# Patient Record
Sex: FEMALE | Race: WHITE | Employment: OTHER | URBAN - METROPOLITAN AREA
[De-identification: names, ages, dates, MRNs, and addresses within clinical notes are randomized per-mention and may not be internally consistent; named-entity substitution may affect disease eponyms.]

---

## 2024-01-24 ENCOUNTER — APPOINTMENT (OUTPATIENT)
Dept: INTEGRATIVE MEDICINE | Facility: CLINIC | Age: 74
End: 2024-01-24
Payer: MEDICARE

## 2024-01-24 ENCOUNTER — ALLIED HEALTH (OUTPATIENT)
Dept: INTEGRATIVE MEDICINE | Facility: CLINIC | Age: 74
End: 2024-01-24
Payer: MEDICARE

## 2024-01-24 DIAGNOSIS — R53.83 FATIGUE, UNSPECIFIED TYPE: Primary | ICD-10-CM

## 2024-01-24 DIAGNOSIS — Z71.82 EXERCISE COUNSELING: ICD-10-CM

## 2024-01-24 DIAGNOSIS — R23.2 HOT FLASHES: ICD-10-CM

## 2024-01-24 DIAGNOSIS — K59.00 CONSTIPATION, UNSPECIFIED CONSTIPATION TYPE: ICD-10-CM

## 2024-01-24 PROCEDURE — 99205 OFFICE O/P NEW HI 60 MIN: CPT | Performed by: PHYSICIAN ASSISTANT

## 2024-01-24 NOTE — PROGRESS NOTES
"72 y/o female \"Lina\" with PMH HTN, CVA, HLD, anxiety, hyponatremia presents for initial consultation. Self-referral.     Goal of Visit: weakness, hot flashes, digestion, \"hormone problem\"  - avoid pharmaceuticals    Care Team: PCP twice yearly, will see new PCP at [facility] tomorrow; Homeopathic provider    Somatic Complaints:  - Wellness encounter at former employer- reiki, massage, bioenergy, nutrition. Dr. Campbell was able to wean her off BP meds with Jeramielee leigh, mag, potassium. Also follows Dr. Pfeiffer's health journey.   - 4/2021- TIA, a lot of stress at that time  - 11/2023- intermittent, rare \"sensations\" in her head; pulling of L hand; went to ER, negative workup although no MRI  - 1/2024- burning sensation across upper back, \"flutter in chest\"-- seen in ED and workup unremarkable- told probable GERD  - HTN- well-controlled with Rx  - Carotid US- stenosis (1-39%) of right and left ICA  - Hx hyponatremia induced by drinking too much water; now uses gatorade daily   - HLD- on statin 5/2021, recently switched to pravastatin; 1/5/2024- LDL 54, HDL 66, total chol 133; reviewed trends. Offered reassurance about low LDL. Can discuss reducing dose with PCP although do not recommend complete cessation due to rebound effect.   - Weakness x few months- noticeable with going up stairs; improves with consistent exercise  - Fatigue- in muscles  - Hot flash and thirst early in AM or during the day x 1 month-- pcp told her likely due to snoring  - Digestion- hx constipation- treats with Miralax, prune juice  - Belching in AM prior to eating; saw GI who Rx PPI x few week; wants to see another GI specialist  - Toes- become numb with lying down at night to sleep; no color changes or weakness; no symptoms during the day  - FH- dad and pat GM (CVA)    Meds/Supplements:  Amlodipine  Metoprolol  B complex  Vit D 5000  Calcium   Sertraline  CoQ10 200 mg  BiOmega  Fish oil     Recent labs (1/5/2024)  B12 557  Folate 23  Vit D 60   Mag " "2.2  A1C 5.6%  Thyroid panel- full WNL      Sleep Hygiene: Averages 8 hours per night. Wakes up 5am with hot flash and \"bad thoughts.\" Occasionally wakes up in middle of the night and cannot fall back asleep.    Physical Activity: Has a [employer] membership- sauna, steam room, pool. Has stationary pedal. Has not gone to gym due to weakness.     Stress Management:  - Stressors- watching news   - Stress in the body- mind racing  - Coping strategies- REVIEW FURTHER AT NEXT VISIT      Nutrition: 84 oz water, 20 oz gatorade. Tried blood type diet. Recommend to keep 3 day food journal and return for nutrition discussion.     Assessment/Plan:  Constipation   Wind down routine  Sleep hygiene  5 senses   Movement routine  Constipation   Gut health      Review of Systems:  Constitutional: afebrile, +fatigue, +weakness   Respiratory: no shortness of breath  Cardiovascular: no chest  pain  Abdominal: no abdominal pain, +constipation  Musculoskeletal: no joint pain or swelling   Skin: no rash      Physical Exam:   General: alert and oriented; well-developed, well-nourished, no acute distress  HEENT: normocephalic, atraumatic, good eye contact  Respiratory: no labored breathing, no conversational dyspnea  Musculoskeletal: moving all extremities appropriately  Neurology: normal gait  Psych: pleasant affect, cooperative    "

## 2024-01-24 NOTE — PATIENT INSTRUCTIONS
Work on sleep optimization:  Hot flashes could be from cortisol and blood sugar changes that occur early in the morning. To reduce symptoms, work on improving behavior around sleep.  Create a wind down routine 1 hour before bed  Turn off screens  Reduce house lighting  Read a book  Take a shower or bath  Avoid eating 3 hours before bed. If you need a snack, higher protein/fat/fiber is preferred  Handful of berries and almonds  Apple with nut butter  If you wake up and cannot go back to sleep, use the 5 senses grounding technique to help (see Sleep Hygiene handout).  Constipation:  Continue with adequate daily hydration  Goal of walking 30 minutes per day  Break it up into 10 minute intervals if needed  March in place with support from a chair or table  Gradually increase nutrient dense, high fiber foods.  Increase vitamin C rich foods (a natural gastric motility agent)  Weakness:  Goal of consistent meal times and higher protein intake to reduce swings in blood sugar.  Check your blood pressure when you become symptomatic.   You do not have to do this incessantly, just a few times per week.   Handouts:  Constipation  Gut health to whole health  Sleep hygiene

## 2024-02-09 ENCOUNTER — ALLIED HEALTH (OUTPATIENT)
Dept: INTEGRATIVE MEDICINE | Facility: CLINIC | Age: 74
End: 2024-02-09
Payer: MEDICARE

## 2024-02-09 DIAGNOSIS — R53.83 FATIGUE, UNSPECIFIED TYPE: Primary | ICD-10-CM

## 2024-02-09 DIAGNOSIS — Z71.3 NUTRITIONAL COUNSELING: ICD-10-CM

## 2024-02-09 PROCEDURE — 99215 OFFICE O/P EST HI 40 MIN: CPT | Performed by: PHYSICIAN ASSISTANT

## 2024-02-09 NOTE — PROGRESS NOTES
"74 y/o female \"Lina\" with PMH HTN, CVA, HLD, anxiety, hyponatremia presents for follow up.      Successes:  - Weakness was less noticeable in the last two weeks, then symptoms returned yesterday. She has not checked BP when she becomes symptomatic.   - Improved BM with higher fiber foods  - Plans to see chiro next week  - Established care with new PCP- reduced pravastatin to 20 mg      Challenges:  - Frustrated because she cannot get anything done  - Hot flashes- more frequent during the day  - Believes she has been having low blood sugars around 11-1130am then before dinner. Would like more education re: blood sugar.   - Anxiety with constipation      Nutrition: 84 oz water, 20 oz gatorade. Tried blood type diet. Out to eat few times per week. Avoids lunch meat.   B- oatmeal, craisins, walnuts; OR 2 fried eggs; strawberries, blueberries, cherries, banana; cottage cheese; unsweetened yogurt; 1/4 blueberry muffins with nut butter   L- salad- grilled chicken, mushrooms, onions; grilled chicken sandwich; tuna in lettuce wrap    D- Hamburger with spaghetti, chili; pork chops; salmon; steak; vegetables- broccoli, cauli, carrots; soup; corned beef and cabbage (rare)   Snacks- rare chocolate or cookies  Fluids- water; occ Bolthouse drink or pomegranate juice; almond milk     Assessment/plan:  Blood sugar   25-30 grams per meal   Optimize oatmeal   Breakfast- oatmeal 4x/week, eggs 2-3, cottage cheese/yogurt   Prunes, reduce juice         RECALL 1/24/2024  Goal of Visit: weakness, hot flashes, digestion, \"hormone problem\"  - avoid pharmaceuticals     Care Team: PCP twice yearly, will see new PCP at [facility] tomorrow; Homeopathic provider     Somatic Complaints:  - Wellness encounter at former employer- reiki, massage, bioenergy, nutrition. Dr. Campbell was able to wean her off BP meds with Jeramie leigh, mag, potassium. Also follows Dr. Pfeiffer's health journey.   - 4/2021- TIA, a lot of stress at that time  - 11/2023- " "intermittent, rare \"sensations\" in her head; pulling of L hand; went to ER, negative workup although no MRI  - 1/2024- burning sensation across upper back, \"flutter in chest\"-- seen in ED and workup unremarkable- told probable GERD  - HTN- well-controlled with Rx  - Carotid US- stenosis (1-39%) of right and left ICA  - Hx hyponatremia induced by drinking too much water; now uses gatorade daily   - HLD- on statin 5/2021, recently switched to pravastatin; 1/5/2024- LDL 54, HDL 66, total chol 133; reviewed trends. Offered reassurance about low LDL. Can discuss reducing dose with PCP although do not recommend complete cessation due to rebound effect.   - Weakness x few months- noticeable with going up stairs; improves with consistent exercise  - Fatigue- in muscles  - Hot flash and thirst early in AM or during the day x 1 month-- pcp told her likely due to snoring  - Digestion- hx constipation- treats with Miralax, prune juice  - Belching in AM prior to eating; saw GI who Rx PPI x few week; wants to see another GI specialist  - Toes- become numb with lying down at night to sleep; no color changes or weakness; no symptoms during the day  - FH- dad and pat GM (CVA)     Meds/Supplements:  Amlodipine  Metoprolol  B complex  Vit D 5000  Calcium   Sertraline  CoQ10 200 mg  BiOmega  Fish oil      Recent labs (1/5/2024)  B12 557  Folate 23  Vit D 60   Mag 2.2  A1C 5.6%  Thyroid panel- full WNL        Sleep Hygiene: Averages 8 hours per night. Wakes up 5am with hot flash and \"bad thoughts.\" Occasionally wakes up in middle of the night and cannot fall back asleep.     Physical Activity: Has a [employer]- used rowing machine. Has stationary pedal and hand weights at home, elliptical, walking. Has not gone to gym due to weakness.      Stress Management:  - Stressors- watching news   - Stress in the body- mind racing  - Coping strategies- REVIEW FURTHER AT NEXT VISIT        Nutrition: 84 oz water, 20 oz gatorade. Tried " blood type diet. Recommend to keep 3 day food journal and return for nutrition discussion.      Assessment/Plan:  Constipation   Wind down routine  Sleep hygiene  5 senses   Movement routine  Constipation   Gut health        Review of Systems:  Constitutional: afebrile, +fatigue, +weakness   Respiratory: no shortness of breath  Cardiovascular: no chest  pain  Abdominal: no abdominal pain, +constipation  Musculoskeletal: no joint pain or swelling   Skin: no rash        Physical Exam:   General: alert and oriented; well-developed, well-nourished, no acute distress  HEENT: normocephalic, atraumatic, good eye contact  Respiratory: no labored breathing, no conversational dyspnea  Musculoskeletal: moving all extremities appropriately  Neurology: normal gait  Psych: pleasant affect, cooperative

## 2024-02-12 NOTE — PATIENT INSTRUCTIONS
Keep up the good work with nutritional changes and hydration!  If you become symptomatic with weakness, take your blood pressure to see if this correlates.   You can also keep a food and symptom log.  Write down what you eat/drink and at what time  Document if/when you experience weakness  Nutrition:  To stabilize blood sugar, we need a balance of protein/fat/fiber at each meal and snack.  Goal of 25-30 grams protein per meal  Optimize your oatmeal by adding more nutrient dense foods  ½-1 tablespoon ground flax or jimenez seed  ½ teaspoon of cinnamon  Handful of berries  Breakfast  Diversity is best!  Oatmeal 3-4x/week, eggs 2-3x/week, cottage cheese or yogurt on remaining days  Reduce all sugar-sweetened beverages  4 grams sugar= 1 teaspoon (look at labels)  Dilute 2 oz of pomegranate juice in 6 oz of water  Bolthouse smoothies are typically high in sugar.  Handouts:  Animal vs. plant protein handout

## 2024-02-21 ENCOUNTER — OFFICE VISIT (OUTPATIENT)
Dept: DERMATOLOGY | Facility: CLINIC | Age: 74
End: 2024-02-21
Payer: MEDICARE

## 2024-02-21 DIAGNOSIS — L57.8 DIFFUSE PHOTODAMAGE OF SKIN: Primary | ICD-10-CM

## 2024-02-21 DIAGNOSIS — L82.1 SEBORRHEIC KERATOSIS: ICD-10-CM

## 2024-02-21 DIAGNOSIS — D18.01 HEMANGIOMA OF SKIN: ICD-10-CM

## 2024-02-21 DIAGNOSIS — D22.9 MULTIPLE BENIGN NEVI: ICD-10-CM

## 2024-02-21 DIAGNOSIS — L81.4 LENTIGO: ICD-10-CM

## 2024-02-21 PROCEDURE — 1159F MED LIST DOCD IN RCRD: CPT | Performed by: STUDENT IN AN ORGANIZED HEALTH CARE EDUCATION/TRAINING PROGRAM

## 2024-02-21 PROCEDURE — 1036F TOBACCO NON-USER: CPT | Performed by: STUDENT IN AN ORGANIZED HEALTH CARE EDUCATION/TRAINING PROGRAM

## 2024-02-21 PROCEDURE — 99213 OFFICE O/P EST LOW 20 MIN: CPT | Performed by: STUDENT IN AN ORGANIZED HEALTH CARE EDUCATION/TRAINING PROGRAM

## 2024-02-21 RX ORDER — AZELASTINE HCL 205.5 UG/1
1 SPRAY NASAL 2 TIMES DAILY
COMMUNITY
Start: 2023-01-18

## 2024-02-21 RX ORDER — PRAVASTATIN SODIUM 20 MG/1
20 TABLET ORAL DAILY
COMMUNITY
Start: 2024-01-26 | End: 2024-04-25

## 2024-02-21 RX ORDER — METOPROLOL TARTRATE 50 MG/1
50 TABLET ORAL DAILY
COMMUNITY
Start: 2023-07-17 | End: 2024-07-11

## 2024-02-21 RX ORDER — ASPIRIN 81 MG/1
81 TABLET ORAL DAILY
COMMUNITY
Start: 2018-01-23

## 2024-02-21 RX ORDER — EPINEPHRINE 0.22MG
100 AEROSOL WITH ADAPTER (ML) INHALATION
COMMUNITY

## 2024-02-21 RX ORDER — AMLODIPINE BESYLATE 10 MG/1
10 TABLET ORAL DAILY
COMMUNITY

## 2024-02-21 RX ORDER — FLUTICASONE PROPIONATE 50 MCG
2 SPRAY, SUSPENSION (ML) NASAL AS NEEDED
COMMUNITY

## 2024-02-21 NOTE — PROGRESS NOTES
Subjective   Liza He is a 73 y.o. female who presents for the following: Skin Check (LV: 4/27/22 FBSE.  No lesions of concern today.)    Skin Cancer History  None    Family History of Skin Cancer  None     The following portions of the chart were reviewed this encounter and updated as appropriate:         Review of Systems: No other skin or systemic complaints.    Objective   Well appearing patient in no apparent distress; mood and affect are within normal limits.    A full examination was performed including scalp, head, eyes, ears, nose, lips, neck, chest, axillae, abdomen, back, buttocks, bilateral upper extremities, bilateral lower extremities, hands, feet, fingers, toes, fingernails, and toenails. All findings within normal limits unless otherwise noted below.    Scattered cherry-red papule(s).    Scattered tan macules in sun-exposed areas.    Stuck on verrucous, tan-brown papules and plaques.      Scattered, uniform and benign-appearing, regular brown melanocytic papules and macules.    Mottled pigmentation and telangiectasias consistent with sun damage      Assessment/Plan   Hemangioma of skin    Reassured of benign nature of lesions    Lentigo    Reassured of benign nature of lesions    Seborrheic keratosis    Reassured of benign nature of lesions    Multiple benign nevi    Reassured of benign nature of lesions    Diffuse photodamage of skin    Encouraged sun protection with opting for shade when feasible, wearing sun protective clothing, and/or using sunscreen SPF 30+ daily.         Scribe Attestation  By signing my name below, IAzalea LPN , Scribe   attest that this documentation has been prepared under the direction and in the presence of Mukesh Mooney MD.

## 2024-02-21 NOTE — PATIENT INSTRUCTIONS

## 2025-02-26 ENCOUNTER — APPOINTMENT (OUTPATIENT)
Dept: DERMATOLOGY | Facility: CLINIC | Age: 75
End: 2025-02-26
Payer: MEDICARE

## 2025-02-26 DIAGNOSIS — L57.8 DIFFUSE PHOTODAMAGE OF SKIN: ICD-10-CM

## 2025-02-26 DIAGNOSIS — D18.01 HEMANGIOMA OF SKIN: Primary | ICD-10-CM

## 2025-02-26 DIAGNOSIS — L82.1 SEBORRHEIC KERATOSIS: ICD-10-CM

## 2025-02-26 DIAGNOSIS — D22.9 MULTIPLE BENIGN NEVI: ICD-10-CM

## 2025-02-26 DIAGNOSIS — L81.4 LENTIGO: ICD-10-CM

## 2025-02-26 PROCEDURE — 1159F MED LIST DOCD IN RCRD: CPT | Performed by: STUDENT IN AN ORGANIZED HEALTH CARE EDUCATION/TRAINING PROGRAM

## 2025-02-26 PROCEDURE — 99213 OFFICE O/P EST LOW 20 MIN: CPT | Performed by: STUDENT IN AN ORGANIZED HEALTH CARE EDUCATION/TRAINING PROGRAM

## 2025-02-26 RX ORDER — BRIMONIDINE TARTRATE 2 MG/ML
1 SOLUTION/ DROPS OPHTHALMIC 2 TIMES DAILY
COMMUNITY
Start: 2025-02-22

## 2025-02-26 RX ORDER — OMEPRAZOLE 40 MG/1
40 CAPSULE, DELAYED RELEASE ORAL
COMMUNITY
Start: 2025-02-14 | End: 2025-06-14

## 2025-02-26 NOTE — PROGRESS NOTES
Subjective   Liza He is a 74 y.o. female who presents for the following: Skin Check (LV: 2/21/24: FBSE.  No bleeding, changing, painful or itchy lesions today.)    Skin Cancer History  None     Family History of Skin Cancer  None     The following portions of the chart were reviewed this encounter and updated as appropriate:         Review of Systems: Pertinent items are noted in HPI.    Objective   Well appearing patient in no apparent distress; mood and affect are within normal limits.    A full examination was performed including scalp, head, eyes, ears, nose, lips, neck, chest, axillae, abdomen, back, buttocks, bilateral upper extremities, bilateral lower extremities, hands, feet, fingers, toes, fingernails, and toenails. All findings within normal limits unless otherwise noted below.    Scattered cherry-red papule(s).    Mottled pigmentation and telangiectasias consistent with sun damage    Scattered tan macules in sun-exposed areas.    Stuck on verrucous, tan-brown papules and plaques.      Scattered, uniform and benign-appearing, regular brown melanocytic papules and macules.      Assessment/Plan   Hemangioma of skin    Reassured of benign nature of lesions    Diffuse photodamage of skin    Encouraged sun protection with opting for shade when feasible, wearing sun protective clothing, and/or using sunscreen SPF 30+ daily.     Related Procedures  Follow Up In Dermatology - Established Patient    Lentigo    Reassured of benign nature of lesions    Seborrheic keratosis    Reassured of benign nature of lesions    Multiple benign nevi    Reassured of benign nature of lesions    Continue to follow up every 1-2 years for routine FBSE or earlier prn for new/changing/concerning lesions     Scribe Attestation  By signing my name below, Azalea HICKS LPN , Scribe   attest that this documentation has been prepared under the direction and in the presence of Mukesh Rodriges MD.

## 2025-02-26 NOTE — PATIENT INSTRUCTIONS
"WHAT TO LOOK FOR: ABCDES OF MELANOMA:    A is for Asymmetry  One half of the spot is unlike the other half.    B is for Border  The spot has an irregular, scalloped, or poorly defined border.    C is for Color  The spot has varying colors from one area to the next, such as shades of tan, brown or black, or areas of white, red, or blue.    D is for Diameter  While melanomas are usually greater than 6 millimeters, or about the size of a pencil eraser, when diagnosed, they can be smaller.    E is for Evolving  The spot looks different from the rest or is changing in size, shape, or color.    SUNSCREEN AND SUN PROTECTION    Ultraviolet radiation from the sun is the main cause of skin cancer as well as sun damage (brown spots, wrinkles and more).  Your best protection from the sun is to stay out of the mid-day sun (from 10am-3pm), seek shade, and cover your skin with clothing and hats.  Wear a swim shirt when swimming.  Sunscreen should be used to areas that aren't covered, including lips.    We prefer sunscreens that are SPF 30 or higher.  Sunscreens should be applied liberally and reapplied every 2 hours, more often when swimming or sweating.    If you will be sweating or swimming, choose a sunscreen that is labeled \"Water resistant 80 minutes\".  This is the highest waterproof rating from the FDA.      Use a moisturizer with sunscreen daily to protect your sun-exposed areas such as the face, neck and backs of hands.  Some drugstore brands to try are Neutrogena Healthy Defense Daily Moisturizer (PureScreen) SPF 50 or CeraVe Face Lotion Invisible Zinc SPF 50.  Elta MD products are slightly more expensive and must be ordered through Amazon or the Elta website.  We like their UV Daily or UV Clear.      For body sunscreen when doing outdoor activity, some to try include Sun Bum products, Aveeno Baby Continuous Protection SPF 50 for sensitive skin, Blue Lizard SPF 30+, All Good sport sunscreen SPF 50, or Banana Boat Simply " Protect Sport Sunscreen lotion spf 50.  Sticks, gels, and sprays are also great and can be used for areas of the body that are difficult to cover with lotion.    If you have brown spots such as melasma or lentigenes, choose a tinted sunscreen.  There are ingredients in tinted sunscreens (iron oxide) that do a better job blocking certain types of light that cause brown spots.  We like Elta MD UV Clear tinted or Elta MD UV Daily tinted, which can be ordered on [employer] or from [employer]. You can also try Coola Mineral Face Matte Moisturizer SPF 30 or Australian Gold Botaniacal Suncreen SPF 50 Tinted Face Mineral Lotion.    There are two types of sunscreens: Chemical sunscreens, such as those that contain the ingredients avobenzone and oxybenzone, and Physical sunscreens, such as those that contain Zinc oxide and Titanium dioxide. Chemical sunscreens absorb light and absorb into the skin.  They must be applied 15 minutes before sun exposure.  Physical sunscreens reflect the light and are not absorbed into the skin.  They should be applied 5 minutes before sun exposure.  Some patients worry about the effects of sunscreens that are absorbed into the skin.  If you are worried about this, use the physical (zinc/titanium sunscreens)- look at the label before buying.  There is lots of scientific evidence that sunlight causes cancer, but there is no direct evidence that sunscreens are harmful.  However, the FDA has asked for further study of the chemical sunscreens to make sure they do not have any health effects on humans.